# Patient Record
Sex: FEMALE | Race: WHITE | NOT HISPANIC OR LATINO | Employment: FULL TIME | URBAN - METROPOLITAN AREA
[De-identification: names, ages, dates, MRNs, and addresses within clinical notes are randomized per-mention and may not be internally consistent; named-entity substitution may affect disease eponyms.]

---

## 2017-01-31 ENCOUNTER — PRE VISIT (OUTPATIENT)
Dept: DERMATOLOGY | Facility: CLINIC | Age: 42
End: 2017-01-31

## 2017-01-31 NOTE — TELEPHONE ENCOUNTER
Left message for patient regarding upcoming appointment on 2/22/17 at 9:30am.  Informed patient to bring an updated list of allergies, medications, pharmacy details and insurance information. Asked patient to bring any dermatology records from outside [place] or the [facility] or have them faxed to [phone].    Patient Reminders Given:  --Plan on being in our facility for approximately one hour, this includes the registration process, office visit, education and check-out process.      --We are located on the second floor of the building, check in desk 4.   --If you need to cancel or reschedule, call [phone].  --We look forward to seeing you in Dermatology Clinic.     Tavia Ann LPN

## 2017-11-12 ENCOUNTER — HEALTH MAINTENANCE LETTER (OUTPATIENT)
Age: 42
End: 2017-11-12

## 2022-02-10 ENCOUNTER — TRANSCRIBE ORDERS (OUTPATIENT)
Dept: OTHER | Age: 47
End: 2022-02-10
Payer: COMMERCIAL

## 2022-02-10 DIAGNOSIS — Z17.0 ESTROGEN RECEPTOR POSITIVE STATUS (ER+): Primary | ICD-10-CM

## 2022-02-10 DIAGNOSIS — C50.412 MALIGNANT NEOPLASM OF UPPER-OUTER QUADRANT OF LEFT FEMALE BREAST (H): ICD-10-CM

## 2022-02-14 NOTE — PROGRESS NOTES
RECORDS STATUS - BREAST    RECORDS REQUESTED FROM: [facility] ([facility])    DATE REQUESTED: 2/17/2022   NOTES DETAILS STATUS   OFFICE NOTE from referring provider Complete Epic   Ref: Dr. Smith   OFFICE NOTE from medical oncologist Complete 2/7/2022 - OP Visit- Malignant neoplasm of upper-outer quadrant of left breast in female, estrogen receptor positive (HRC)     1/31/2022 -   Malignant neoplasm of upper-outer quadrant of left breast in female, estrogen receptor positive (HRC)      [facility] in [facility]   OFFICE NOTE from surgeon Complete See Breast biopsy from 10/5/2021 in [facility]   OFFICE NOTE from radiation oncologist     DISCHARGE REPORT from the ER     OPERATIVE REPORT Complete See Breast Biopsy Report in [facility] (PN)    MEDICATION LIST Complete [facility]   CLINICAL TRIAL TREATMENTS TO DATE     LABS     REQUEST BLOCKS FOR ALL BREAST CANCER PTS     PATHOLOGY REPORTS  (Tissue diagnosis, Stage, ER/ME percentage positive and intensity of staining, HER2 IHC, FISH, and all biopsies from breast and any distant metastasis)                 Complete 10/5/2021   [facility] Path Report in [facility]   A.  Breast, left, 1 o'clock  ultrasound, needle core biopsy:    Invasive lobular carcinoma, [place] grade 1      GENONOMIC TESTING     TYPE:   (Next Generation Sequencing, including Foundation One testing, and Oncotype score)     IMAGING (NEED IMAGES & REPORT)     CT SCANS     MRI Complete- [facility] MRI Breast 10/11/2021   MAMMO Complete- [facility] 12/20/2021, 10/5/2021   ULTRASOUND Complete- [facility] US Breast 12/20/2021   PET     BONE SCAN     BRAIN MRI       Action    Action Taken 2/14/2022 7:21AM [facility]     I faxed a STAT request for IMG to [facility]    10:11AM TULIO   I called pt Marylou - ori. I tried calling

## 2022-02-17 ENCOUNTER — OFFICE VISIT (OUTPATIENT)
Dept: RADIATION ONCOLOGY | Facility: CLINIC | Age: 47
End: 2022-02-17
Payer: COMMERCIAL

## 2022-02-17 ENCOUNTER — PRE VISIT (OUTPATIENT)
Dept: RADIATION ONCOLOGY | Facility: CLINIC | Age: 47
End: 2022-02-17

## 2022-02-17 VITALS
DIASTOLIC BLOOD PRESSURE: 82 MMHG | OXYGEN SATURATION: 99 % | RESPIRATION RATE: 18 BRPM | WEIGHT: 147.4 LBS | HEART RATE: 98 BPM | SYSTOLIC BLOOD PRESSURE: 112 MMHG | TEMPERATURE: 98.7 F

## 2022-02-17 DIAGNOSIS — Z17.0 ESTROGEN RECEPTOR POSITIVE STATUS (ER+): ICD-10-CM

## 2022-02-17 DIAGNOSIS — C50.412 MALIGNANT NEOPLASM OF UPPER-OUTER QUADRANT OF LEFT FEMALE BREAST (H): ICD-10-CM

## 2022-02-17 PROCEDURE — 99207 PR CDG-MDM COMPONENT: MEETS MODERATE - DOWN CODED: CPT | Performed by: SURGERY

## 2022-02-17 PROCEDURE — 99204 OFFICE O/P NEW MOD 45 MIN: CPT | Performed by: SURGERY

## 2022-02-17 RX ORDER — SPIRONOLACTONE 25 MG/1
25 TABLET ORAL
COMMUNITY
Start: 2021-05-28 | End: 2022-05-28

## 2022-02-17 RX ORDER — SERTRALINE HYDROCHLORIDE 100 MG/1
1 TABLET, FILM COATED ORAL DAILY
COMMUNITY
Start: 2021-05-28

## 2022-02-17 ASSESSMENT — PAIN SCALES - GENERAL: PAINLEVEL: NO PAIN (0)

## 2022-02-17 NOTE — LETTER
2022         RE: Marylou Vera  [address]  [place] [address]        Dear Colleague,    Thank you for referring your patient, Marylou Vera, to the [facility] [place]. Please see a copy of my visit note below.       Department of Radiation Oncology  [facility]  [facility]  [address] [address] [place] [id number]  [phone]       Consultation Note    Name: Marylou Vera MRN: [id number]   : 1975   Date of Service: 2022  Referring: Dr. Hadley     Reason for consultation: left breast cancer    History of Present Illness   Ms. Vera is a 46 year old female presenting with a LEFT ER+ (40%)/Per+ (1%)/Her2- invasive lobular carcinoma, multifocal cT3N0, grade 1 s/p NACT (completed AC x4, taxol with partial response. She is pending surgery with Dr. Smith in the future.    Briefly, her oncologic history is as follows:    Patient is a 46-year-old female who underwent a mammogram on 10/4/2021.  At that point mammogram demonstrated a large spiculated mass in the left breast at the 1 o'clock position approximately 5.5 cm in size, with the right breast being negative for any abnormality.    Ultrasound was performed on 10/4/2021 demonstrating a large spiculated mass in the left breast, without any evidence of lymphadenopathy.    She underwent biopsy on 10/5/2021 with pathology returning as an invasive lobular carcinoma, grade 1, ER positive (40%)/TX positive (1%)/HER-2 negative.    Breast MRI was obtained on 10/11/2021 demonstrating abnormal enhancement pattern measuring up to 8.2 cm in size extending from the 11 o'clock position to the 5 o'clock position with depth anterior to posterior, with evidence of a questionable satellite mass at the 12 o'clock position as well.  There is no evidence of any lymphadenopathy.  Based on this staging, she would be a clinically multifocal T3N0  left breast cancer (clinical prognostic Stage IIA).    She underwent genetic testing which was negative.    She was seen by Dr. Hadley (medical oncology) with plans for initiating neoadjuvant chemotherapy with AC x4 followed by Taxol x12.  After completion of AC she underwent a repeat imaging with ultrasound and mammogram.  Ultrasound on 2021 demonstrated scattered areas of minimal distortion but nothing measurable, while mammogram identified architectural distortion in the left upper outer quadrant thus slightly dense than prior mammogram with persistent abnormality.    She has remaining Taxol to be completed and we will tentatively complete neoadjuvant chemotherapy on 2022 she will also obtain a repeat breast MRI on 2022 and is going to meet with both the reconstructive surgeon as well as the resection surgeon (Dr. Smith) on 2022.    Today, patient denies any breast tenderness, pain, drainage, fevers, chills, extremity range of motion difficulties, chest pain, dyspnea, or weight loss.     Past Medical History:   Past Medical History:   Diagnosis Date     Malignant neoplasm of left breast in female, estrogen receptor positive (H) 10/05/2021     Moderate major depression (H)      Personal history of chemotherapy     Adriamycin/Cytoxan + Taxol (Dr. Hadely - [facility]) 2022 - 3/14/2022       Past Surgical History:   Past Surgical History:   Procedure Laterality Date     BREAST BIOPSY, CORE RT/LT Left 10/05/2021      SECTION        SECTION       EXTRACTION OF WISDOM TEETH         Chemotherapy History:  Per HPI    Radiation History:  No prior radiation    Pregnant: No (will need bHCG prior to radiation)  Implanted Cardiac Devices: No    Medications:  Current Outpatient Medications   Medication     sertraline (ZOLOFT) 100 MG tablet     spironolactone (ALDACTONE) 25 MG tablet     No current facility-administered medications for this visit.          Allergies:     Allergies   Allergen Reactions     Doxycycline Nausea and Other (See Comments)     joint pain     Propofol      Had flash pulmonary edema following D&C, consensus is this was cause.     Augmentin Nausea       Social History:  Tobacco: Non-smoker  Alcohol: Occasional  Employment:  at [employer]    Family History:  Family History   Problem Relation Age of Onset     Lipids Mother      Prostate Cancer Father      Lung Cancer Maternal Grandfather      Testicular cancer Paternal Grandfather        Review of Systems   A 10-point review of systems was performed. Pertinent findings are noted in the HPI.    Physical Exam   ECOG Status: 0    Vitals:  /82 (BP Location: Right arm, Patient Position: Chair, Cuff Size: Adult Regular)   Pulse 98   Temp 98.7  F (37.1  C) (Oral)   Resp 18   Wt 66.9 kg (147 lb 6.4 oz)   LMP 11/01/2021 (Approximate)   SpO2 99%     Gen: Alert, in NAD  Head: NC/AT  Eyes: PERRL, EOMI, sclera anicteric  Ears: No external auricular lesions  Nose/sinus: No rhinorrhea or epistaxis  Breast: breast incision healing well, incision CDI, no dehiscence, no upper extremity range of motion limitation     Oral cavity/oropharynx: MMM, no visible oral cavity lesions  Neck: Full ROM, supple, no palpable adenopathy  Pulm: No wheezing, stridor or respiratory distress  CV: Extremities are warm and well-perfused, no cyanosis, no pedal edema  Abdominal: Normal bowel sounds, soft, nontender, no masses  Musculoskeletal: Normal bulk and tone  Skin: Normal color and turgor  Neuro: A/Ox3, CN II-XII intact, normal gait    Imaging/Path/Labs   Imaging: per HPI, personally reviewed and in agreement     Path: per HPI, personally reviewed and in agreement     Labs: per HPI, personally reviewed and in agreement     Assessment    Ms. Vera is a 46 year old female presenting with a LEFT ER+ (40%)/Per+ (1%)/Her2- invasive lobular carcinoma, multifocal cT3N0, grade 1 (clinical prognostic Stage  IIA) s/p NACT (completed AC x4, taxol with partial response). She is pending surgery with Dr. Smith in the future.    In general, we discussed the role of adjuvant whole breast radiotherapy after breast conservation surgery per NCCN guidelines. In patients with negative axillary lymph nodes, WBRT +/- boost is standard of care.  In general, the benefits for whole breast radiation were reviewed and explained that adjuvant whole breast radiation following wide local excision decreases the risk of local recurrence by two-thirds to three-fourths and improves overall survival (Viveros et al., NSABP B-06, NEJ, 2002; EBCTCG Metanalysis, Lancet 2011).      In general, we reviewed the NCCN Guidelines for invasive breast cancer following total mastectomy with surgical axillary lymph node staging. If four or more axillary nodes are involved, radiation therapy to the chest wall plus infraclavicular region, supraclavicular area, internal mammary nodes, and any part of the axillary bed at risk is a category 1 recommendation (based on high-level evidence and uniform consensus of the NCCN). In the setting of one to three axillary nodes are involved, the recommendation is to strongly consider radiation to the chest wall plus the regional manju sites as above. In patients with negative axillary lymph nodes and tumor >5cm, the recommendation is to consider radiation to the chest wall and regional lymph nodes. These recommendations are supported by various literature (Rogelio et al, EBCTCG Minerva-analysis, Lancet 2005, update Lancet 2014; Navgaagilda et al., 82b and 82c Combined Analysis, Radiother Oncol 2007).    We also discussed the pros and cons of reconstruction from a oncologic outcome/surveillance and radiation perspective.     However we also discussed that at this point, we do not have a final pathology and that final treatment recommendation from a radiation perspective will be determined depending on final pathology.    Plan      1. Patient will go on to complete neoadjuvant chemotherapy in March 14, 2022.  She will be meeting with surgeon and plastic surgery to discuss resection and reconstruction options.    2.  We will have patient follow-up with radiation oncology approximately 3 weeks post surgery to review final pathology and adjuvant treatment recommendation. We will also plan for a CG prior to CT simulation.      All benefits and risks discussed, and patient is in agreement with the oncologic plan discussed above.     Thank you for allowing me to participate in your patient's care.  If you should require any additional information, please do not hesitate in contacting me.        Guillermo Ruiz MD  Department of Radiation Oncology  [facility]         Again, thank you for allowing me to participate in the care of your patient.        Sincerely,        Guillermo Ruiz MD

## 2022-02-17 NOTE — PATIENT INSTRUCTIONS
What to expect at your Simulation visit:    You will meet with a Radiation Therapist and other team members who will be doing a planning session called a  simulation  with you. This process will determine your daily treatment.    ~ You will lie on a flat table and have a treatment planning CT scan.  It is important during the scan to hold very still and breathe normally.    ~ Your therapist may construct a body mold to help you hold still for your treatments.    ~ If you are having treatment to the head or neck area you will be fitted with a plastic mesh mask that fits very snugly over your face and neck.     ~ Your therapist will be taking some digital photos that will go in your treatment chart.      ~Your therapist will make marks on your skin and take measurements. Your therapist may ask you about making small tattoos (a permanent small dot) over these marks.  These marks are used to position you daily for your radiation therapy treatments. Please do not wash off any marks until all of your radiation therapy treatments are complete unless you are instructed to do so by your therapist.    ~ Once the simulation is completed it can take from 3 to 10 business days before you start radiation therapy treatments.    ~ You may meet with a nurse who will go over management of treatment side effects and self care during your treatments. The nurse will help to plan care with other departments and physicians if needed.      Please contact [facility] RN with questions or concerns following today's appointment: [phone].    Thank you!

## 2022-02-17 NOTE — NURSING NOTE
INITIAL PATIENT ASSESSMENT      Diagnosis: breast cancer (left breast)    Prior radiation therapy: None    Prior chemotherapy:     Protocol: Adriamycin/Cytoxan  Facility: [facility] - Dr. Vijayakumar  Dates: 11/1/2021 - 12/13/2021      Protocol: Taxol  Facility: [facility] - Dr. Vijayakumar  Dates: 12/28/2021 - present, most recently received on 2/14/2022 and scheduled to complete Taxol on 3/14/2022    Patient reports she is scheduled to see plastic surgery on 2/22/2022 and follow-up with surgeon, Dr. Smith is scheduled 3/8/2022.      Prior hormonal therapy: patient reports history of oral birth control use for approximately 25 years, denies history of HRT use.    Pain Eval:  Denies    Psychosocial  Living arrangements: lives at home in [place] with  and two sons (ages 11 and 8), patient reports she is employed as a  with [employer].  Fall Risk: independent  [facility] Falls Risk Screening Completed: Yes Result: Negative   referral needs: Not needed    Advanced Directive: Yes - Location: patient has copy.  Implantable Cardiac Device: No  Authorization To Share Protected Health Information: YES - Date: 2/17/2022    Onset of menarche: age 12  LMP: approximately 11/1/2022, patient reports regular cycles prior to chemotherapy and menstrual cycle has stopped with chemotherapy.  Onset of menopause: NA  Abnormal vaginal bleeding/discharge: No  Are you pregnant? No  Reproductive note: patient reports history of four pregnancies with two live births, first at age 35, reports history of breastfeeding for approximately 12 months in total.  Urine Pregnancy Testing Needed: Yes, to be completed at time of CT Simulation.    Review of Systems     Constitutional: Positive for malaise/fatigue. Negative for chills, diaphoresis, fever and weight loss.        Patient reports generalized fatigue and weakness with chemotherapy.   HENT: Negative.    Eyes: Negative.    Respiratory: Negative.   "  Cardiovascular: Negative.    Gastrointestinal: Negative.    Genitourinary: Negative.    Musculoskeletal: Negative.    Skin: Negative.    Neurological: Positive for tingling. Negative for dizziness, tremors, sensory change, speech change, focal weakness, seizures, loss of consciousness, weakness and headaches.        Patient reports faint neuropathy of fingers and toes related to Taxol.   Endo/Heme/Allergies: Negative.    Psychiatric/Behavioral: Positive for depression. Negative for hallucinations, memory loss, substance abuse and suicidal ideas. The patient is nervous/anxious. The patient does not have insomnia.         Patient reports intermittent depression and anxiety with diagnosis and treatment, \"it's just a really long process\".  Patient reports strong support system through family and friends along with her employer, patient reports she is connected through [employer] and is seeking therapy through a professional counselor.  Active listening and support provided.       Nurse face-to-face time: Level 5:  over 15 min face to face time.    Shanna Wiley RN BSN OCN CBCN      "

## 2022-02-18 NOTE — PROGRESS NOTES
Department of Radiation Oncology  [facility]: Cancer Center  [facility]  [address] [place] [id number]  [phone]       Consultation Note    Name: Marylou Vera MRN: [id number]   : 1975   Date of Service: 2022  Referring: Dr. Hadley     Reason for consultation: left breast cancer    History of Present Illness   Ms. Vera is a 46 year old female presenting with a LEFT ER+ (40%)/Per+ (1%)/Her2- invasive lobular carcinoma, multifocal cT3N0, grade 1 s/p NACT (completed AC x4, taxol with partial response. She is pending surgery with Dr. Smith in the future.    Briefly, her oncologic history is as follows:    Patient is a 46-year-old female who underwent a mammogram on 10/4/2021.  At that point mammogram demonstrated a large spiculated mass in the left breast at the 1 o'clock position approximately 5.5 cm in size, with the right breast being negative for any abnormality.    Ultrasound was performed on 10/4/2021 demonstrating a large spiculated mass in the left breast, without any evidence of lymphadenopathy.    She underwent biopsy on 10/5/2021 with pathology returning as an invasive lobular carcinoma, grade 1, ER positive (40%)/MT positive (1%)/HER-2 negative.    Breast MRI was obtained on 10/11/2021 demonstrating abnormal enhancement pattern measuring up to 8.2 cm in size extending from the 11 o'clock position to the 5 o'clock position with depth anterior to posterior, with evidence of a questionable satellite mass at the 12 o'clock position as well.  There is no evidence of any lymphadenopathy.  Based on this staging, she would be a clinically multifocal T3N0 left breast cancer (clinical prognostic Stage IIA).    She underwent genetic testing which was negative.    She was seen by Dr. Hadley (medical oncology) with plans for initiating neoadjuvant chemotherapy with AC x4 followed by Taxol x12.  After completion of AC  she underwent a repeat imaging with ultrasound and mammogram.  Ultrasound on 2021 demonstrated scattered areas of minimal distortion but nothing measurable, while mammogram identified architectural distortion in the left upper outer quadrant thus slightly dense than prior mammogram with persistent abnormality.    She has remaining Taxol to be completed and we will tentatively complete neoadjuvant chemotherapy on 2022 she will also obtain a repeat breast MRI on 2022 and is going to meet with both the reconstructive surgeon as well as the resection surgeon (Dr. Smith) on 2022.    Today, patient denies any breast tenderness, pain, drainage, fevers, chills, extremity range of motion difficulties, chest pain, dyspnea, or weight loss.     Past Medical History:   Past Medical History:   Diagnosis Date     Malignant neoplasm of left breast in female, estrogen receptor positive (H) 10/05/2021     Moderate major depression (H)      Personal history of chemotherapy     Adriamycin/Cytoxan + Taxol (Dr. Vijayakumar - [facility]) 2022 - 3/14/2022       Past Surgical History:   Past Surgical History:   Procedure Laterality Date     BREAST BIOPSY, CORE RT/LT Left 10/05/2021      SECTION        SECTION       EXTRACTION OF WISDOM TEETH         Chemotherapy History:  Per HPI    Radiation History:  No prior radiation    Pregnant: No (will need bHCG prior to radiation)  Implanted Cardiac Devices: No    Medications:  Current Outpatient Medications   Medication     sertraline (ZOLOFT) 100 MG tablet     spironolactone (ALDACTONE) 25 MG tablet     No current facility-administered medications for this visit.       Allergies:     Allergies   Allergen Reactions     Doxycycline Nausea and Other (See Comments)     joint pain     Propofol      Had flash pulmonary edema following D&C, consensus is this was cause.     Augmentin Nausea       Social History:  Tobacco: Non-smoker  Alcohol:  Occasional  Employment:  at [employer]    Family History:  Family History   Problem Relation Age of Onset     Lipids Mother      Prostate Cancer Father      Lung Cancer Maternal Grandfather      Testicular cancer Paternal Grandfather        Review of Systems   A 10-point review of systems was performed. Pertinent findings are noted in the HPI.    Physical Exam   ECOG Status: 0    Vitals:  /82 (BP Location: Right arm, Patient Position: Chair, Cuff Size: Adult Regular)   Pulse 98   Temp 98.7  F (37.1  C) (Oral)   Resp 18   Wt 66.9 kg (147 lb 6.4 oz)   LMP 11/01/2021 (Approximate)   SpO2 99%     Gen: Alert, in NAD  Head: NC/AT  Eyes: PERRL, EOMI, sclera anicteric  Ears: No external auricular lesions  Nose/sinus: No rhinorrhea or epistaxis  Breast: no upper extremity range of motion limitation     Oral cavity/oropharynx: MMM, no visible oral cavity lesions  Neck: Full ROM, supple, no palpable adenopathy  Pulm: No wheezing, stridor or respiratory distress  CV: Extremities are warm and well-perfused, no cyanosis, no pedal edema  Abdominal: Normal bowel sounds, soft, nontender, no masses  Musculoskeletal: Normal bulk and tone  Skin: Normal color and turgor  Neuro: A/Ox3, CN II-XII intact, normal gait    Imaging/Path/Labs   Imaging: per HPI, personally reviewed and in agreement     Path: per HPI, personally reviewed and in agreement     Labs: per HPI, personally reviewed and in agreement     Assessment    Ms. Vera is a 46 year old female presenting with a LEFT ER+ (40%)/Per+ (1%)/Her2- invasive lobular carcinoma, multifocal cT3N0, grade 1 (clinical prognostic Stage IIA) s/p NACT (completed AC x4, taxol with partial response). She is pending surgery with Dr. Smith in the future.    In general, we discussed the role of adjuvant whole breast radiotherapy after breast conservation surgery per NCCN guidelines. In patients with negative axillary lymph nodes, WBRT +/- boost is standard of care.  In  general, the benefits for whole breast radiation were reviewed and explained that adjuvant whole breast radiation following wide local excision decreases the risk of local recurrence by two-thirds to three-fourths and improves overall survival (Viveros et al., NSABP B-06, NEJ, 2002; EBCTCG Metanalysis, Lancet 2011).      In general, we reviewed the NCCN Guidelines for invasive breast cancer following total mastectomy with surgical axillary lymph node staging. If four or more axillary nodes are involved, radiation therapy to the chest wall plus infraclavicular region, supraclavicular area, internal mammary nodes, and any part of the axillary bed at risk is a category 1 recommendation (based on high-level evidence and uniform consensus of the NCCN). In the setting of one to three axillary nodes are involved, the recommendation is to strongly consider radiation to the chest wall plus the regional manju sites as above. In patients with negative axillary lymph nodes and tumor >5cm, the recommendation is to consider radiation to the chest wall and regional lymph nodes. These recommendations are supported by various literature (Rogelio et al, EBCTCG [place]-analysis, Lancet 2005, update Lancet 2014; Navgaagilda et al., 82b and 82c Combined Analysis, Radiother Oncol 2007).    We also discussed the pros and cons of reconstruction from a oncologic outcome/surveillance and radiation perspective.     However we also discussed that at this point, we do not have a final pathology and that final treatment recommendation from a radiation perspective will be determined depending on final pathology.    Plan     1. Patient will go on to complete neoadjuvant chemotherapy in March 14, 2022.  She will be meeting with surgeon and plastic surgery to discuss resection and reconstruction options.    2.  We will have patient follow-up with radiation oncology approximately 3 weeks post surgery to review final pathology and adjuvant treatment  recommendation. We will also plan for a bHCG prior to CT simulation.      All benefits and risks discussed, and patient is in agreement with the oncologic plan discussed above.     Thank you for allowing me to participate in your patient's care.  If you should require any additional information, please do not hesitate in contacting me.        Guillermo Ruiz MD  Department of Radiation Oncology  [facility]

## 2022-03-19 ENCOUNTER — HEALTH MAINTENANCE LETTER (OUTPATIENT)
Age: 47
End: 2022-03-19

## 2022-04-01 ENCOUNTER — PATIENT OUTREACH (OUTPATIENT)
Dept: RADIATION ONCOLOGY | Facility: CLINIC | Age: 47
End: 2022-04-01
Payer: COMMERCIAL

## 2022-04-01 NOTE — TELEPHONE ENCOUNTER
Patient originally consulted with Dr. Guillermo Ruiz for radiation oncology on 2/17/2022, pre-surgical consultation.  Patient completed chemotherapy on 3/14/2022 and plans for bilateral mastectomies without immediate reconstruction on 4/13/2022.  This RN contacted patient, confirmed above information and surgical date scheduled.  Reviewed plan to bring patient back into clinic two weeks following surgical date for return consultation with Dr. Ruiz and review recommendations for radiation therapy following completion of pathology.  Patient verbalized understanding and return consultation appointment scheduled for Thursday, 4/28/2022 at 0945.   CT Simulation with urine HCG to be scheduled at a later date pending official recommendations for radiation therapy and patient verbalized understanding.  Patient confirmed appointment details and had no questions at this time.    Patient reports post-surgical follow-up scheduled 4/26/2022 with Dr. Smith.    Dr. Ruiz updated via Epic in-basket.    Shanna Wiley RN BSN OCN CBCN

## 2022-04-28 ENCOUNTER — OFFICE VISIT (OUTPATIENT)
Dept: RADIATION ONCOLOGY | Facility: CLINIC | Age: 47
End: 2022-04-28
Payer: COMMERCIAL

## 2022-04-28 VITALS
WEIGHT: 147 LBS | TEMPERATURE: 97.9 F | DIASTOLIC BLOOD PRESSURE: 81 MMHG | HEART RATE: 79 BPM | SYSTOLIC BLOOD PRESSURE: 117 MMHG | OXYGEN SATURATION: 96 % | RESPIRATION RATE: 18 BRPM

## 2022-04-28 DIAGNOSIS — Z17.0 MALIGNANT NEOPLASM OF UPPER-OUTER QUADRANT OF LEFT BREAST IN FEMALE, ESTROGEN RECEPTOR POSITIVE (H): Primary | ICD-10-CM

## 2022-04-28 DIAGNOSIS — C50.412 MALIGNANT NEOPLASM OF UPPER-OUTER QUADRANT OF LEFT BREAST IN FEMALE, ESTROGEN RECEPTOR POSITIVE (H): Primary | ICD-10-CM

## 2022-04-28 PROCEDURE — 99215 OFFICE O/P EST HI 40 MIN: CPT | Performed by: SURGERY

## 2022-04-28 RX ORDER — ACETAMINOPHEN 500 MG
500-1000 TABLET ORAL EVERY 6 HOURS PRN
COMMUNITY

## 2022-04-28 ASSESSMENT — PAIN SCALES - GENERAL: PAINLEVEL: MODERATE PAIN (4)

## 2022-04-28 NOTE — NURSING NOTE
"Return Consultation Visit        Diagnosis: breast cancer (left breast)     Prior Radiation Therapy: None     Prior Chemotherapy:      Protocol: Adriamycin/Cytoxan  Facility: [facility] - Dr. Vijayakumar  Dates: 11/1/2021 - 12/13/2021     Protocol: Taxol  Facility: [facility] - Dr. Vijayakumar  Dates: 12/28/2021 - present, most recently received on 2/14/2022 and scheduled to complete Taxol on 3/14/2022     Surgical History:    4/13/2022: Bilateral mastectomy with left axillary sentinel lymph node biopsy and port removal  Surgeon: Dr. Smith     Prior Hormonal Therapy: patient reports history of oral birth control use for approximately 25 years, denies history of HRT use.     Pain Eval:  Patient reports pain at current visit \"4/10\" and reports discomfort and sore across bilateral chest and bilateral axilla.  Patient reports taking Tylenol 1,000 mg po as needed and Ibuprofen 600 mg po as needed.   Patient denies concerns with range of motion of bilateral upper extremities, patient reports she has been active since removal of VARSHA drains.  Reviewed daily range of motion stretching exercises with patient and also reviewed lymphedema therapy prevention with patient.     Psychosocial  Living arrangements: lives at home in [place] with  and two sons (ages 11 and 8), patient reports she is employed as a  with [employer].  Fall Risk: independent  [facility] Falls Risk Screening Completed: Yes Result: Negative   referral needs: Not needed     Advanced Directive: Yes - Location: patient has copy.  Implantable Cardiac Device: No  Authorization To Share Protected Health Information: YES - Date: 2/17/2022     Onset of menarche: age 12  LMP: approximately 11/1/2022, patient reports regular cycles prior to chemotherapy and menstrual cycle has stopped with chemotherapy.  Onset of menopause: NA  Abnormal vaginal bleeding/discharge: No  Are you pregnant? No  Reproductive note: patient reports history of " four pregnancies with two live births, first at age 35, reports history of breastfeeding for approximately 12 months in total.  Urine Pregnancy Testing Needed: Yes, to be completed at time of CT Simulation.        Nurse face-to-face time: Level 5:  over 15 min face to face time.     Shanna Wiley RN BSN OCN CBCN

## 2022-04-28 NOTE — PATIENT INSTRUCTIONS
Please contact [facility] RN with questions or concerns following today's appointment: [phone].    Thank you!

## 2022-04-28 NOTE — LETTER
2022         RE: Marylou Vera  [address]  [place] [address]        Dear Colleague,    Thank you for referring your patient, Marylou Vera, to the [facility] RADIATION ONCOLOG[facility] [place]. Please see a copy of my visit note below.       Department of Radiation Oncology  [facility]  [facility]  [address] [address] [place] [id number]  [phone]       Follow up Note    Name: Marylou Vera MRN: [id number]   : 1975   Date of Service: 22 Referring: Dr. Hadley     Reason for consultation: left breast cancer    History of Present Illness     Ms. Vera is a 46 year old female presenting with a LEFT ER+ (40%)/UT+ (1%)/Her2- invasive lobular carcinoma, multifocal cT3N0, grade 1 s/p NACT (completed AC x4, taxol with partial response). She underwent bilateral mastectomy and left sentinel lymph node biopsy (Dr. Smith, 22) with pathology revealing residual 7.0 cm of invasive lobular carcinoma, grade 2, negative margins, no LVSI, 0/1 LN, ypT3N0. Right sided mastectomy pathology was negative for malignancy.    Briefly, her oncologic history is as follows:    Patient is a 46-year-old female who underwent a mammogram on 10/4/2021.  At that point mammogram demonstrated a large spiculated mass in the left breast at the 1 o'clock position approximately 5.5 cm in size, with the right breast being negative for any abnormality.    Ultrasound was performed on 10/4/2021 demonstrating a large spiculated mass in the left breast, without any evidence of lymphadenopathy.    She underwent biopsy on 10/5/2021 with pathology returning as an invasive lobular carcinoma, grade 1, ER positive (40%)/UT positive (1%)/HER-2 negative.    Breast MRI was obtained on 10/11/2021 demonstrating abnormal enhancement pattern measuring up to 8.2 cm in size extending from the 11 o'clock position to the 5 o'clock position with  depth anterior to posterior, with evidence of a questionable satellite mass at the 12 o'clock position as well.  There is no evidence of any lymphadenopathy.  Based on this staging, she would be a clinically multifocal T3N0 left breast cancer (clinical prognostic Stage IIA).    She underwent genetic testing which was negative.    She was seen by Dr. Hadley (medical oncology) with plans for initiating neoadjuvant chemotherapy with AC x4 followed by Taxol x12.  After completion of AC she underwent a repeat imaging with ultrasound and mammogram.  Ultrasound on 12/20/2021 demonstrated scattered areas of minimal distortion but nothing measurable, while mammogram identified architectural distortion in the left upper outer quadrant thus slightly dense than prior mammogram with persistent abnormality.    Interval history:    She completed NACT on March 14, 2022. MRI 3/7/22 demonstrated no residual suspicious enhancement in the left breast, most consistent with radiologic response to therapy. There was no lymphadenopathy.    She met with plastic surgery (Dr. Burroughs) with plans for delayed reconstruction.    She subsequently underwent bilateral mastectomy with left sentinel lymph node biopsy on 4/13/2022.  Pathology returned  residual 7.0 cm of invasive lobular carcinoma, grade 2, negative margins, no LVSI, 0/1 LN, ypT3N0. Right sided mastectomy pathology was negative for malignancy.    Today, patient with bilateral breast tenderness, up to a 4/10 and mild edema bilaterally with small seroma formation. However, she denies drainage, fevers, chills, extremity range of motion difficulties, chest pain, dyspnea, or weight loss.     Past Medical History:   Past Medical History:   Diagnosis Date     Malignant neoplasm of left breast in female, estrogen receptor positive (H) 10/05/2021     Moderate major depression (H)      Personal history of chemotherapy     Adriamycin/Cytoxan + Taxol (Dr. Hadley - [facility])  2022 - 3/14/2022       Past Surgical History:   Past Surgical History:   Procedure Laterality Date     BREAST BIOPSY, CORE RT/LT Left 10/05/2021      SECTION        SECTION       EXTRACTION OF WISDOM TEETH         Chemotherapy History:  Per HPI    Radiation History:  No prior radiation    Pregnant: No (will need bHCG prior to radiation)  Implanted Cardiac Devices: No    Medications:  Current Outpatient Medications   Medication     acetaminophen (TYLENOL) 500 MG tablet     Fexofenadine HCl (ALLEGRA PO)     Fluticasone Propionate (FLONASE NA)     IBUPROFEN PO     sertraline (ZOLOFT) 100 MG tablet     spironolactone (ALDACTONE) 25 MG tablet     No current facility-administered medications for this visit.       Allergies:     Allergies   Allergen Reactions     Doxycycline Nausea and Other (See Comments)     joint pain     Propofol      Had flash pulmonary edema following D&C, consensus is this was cause.     Augmentin Nausea       Social History:  Tobacco: Non-smoker  Alcohol: Occasional  Employment:  at [employer]    Family History:  Family History   Problem Relation Age of Onset     Lipids Mother      Prostate Cancer Father      Lung Cancer Maternal Grandfather      Testicular cancer Paternal Grandfather        Review of Systems   A 10-point review of systems was performed. Pertinent findings are noted in the HPI.    Physical Exam   ECOG Status: 0    Vitals:  /81 (BP Location: Right arm, Patient Position: Chair, Cuff Size: Adult Regular)   Pulse 79   Temp 97.9  F (36.6  C) (Oral)   Resp 18   Wt 66.7 kg (147 lb)   SpO2 96%     Gen: Alert, in NAD  Head: NC/AT  Eyes: PERRL, EOMI, sclera anicteric  Ears: No external auricular lesions  Nose/sinus: No rhinorrhea or epistaxis  Breast: b/l chest wall healing well, incisions CDI, no drainage, mild edema/seroma formation, no upper extremity range of motion limitation     Oral cavity/oropharynx: MMM, no visible oral cavity lesions  Neck:  Full ROM, supple, no palpable adenopathy  Pulm: No wheezing, stridor or respiratory distress  CV: Extremities are warm and well-perfused, no cyanosis, no pedal edema  Abdominal: Normal bowel sounds, soft, nontender, no masses  Musculoskeletal: Normal bulk and tone  Skin: Normal color and turgor  Neuro: A/Ox3, CN II-XII intact, normal gait    Imaging/Path/Labs   Imaging: per HPI, personally reviewed and in agreement     Path: per HPI, personally reviewed and in agreement     Labs: per HPI, personally reviewed and in agreement     Assessment      Ms. Vera is a 46 year old female presenting with a LEFT ER+ (40%)/MS+ (1%)/Her2- invasive lobular carcinoma, multifocal cT3N0, grade 1 s/p NACT (completed AC x4, taxol with partial response). She underwent bilateral mastectomy and left sentinel lymph node biopsy (Dr. Smith, 4/13/22) with pathology revealing residual 7.0 cm of invasive lobular carcinoma, grade 2, negative margins, no LVSI, 0/1 LN, ypT3N0. Right sided mastectomy pathology was negative for malignancy.    In general, we discussed the role of adjuvant whole breast radiotherapy after breast conservation surgery per NCCN guidelines. In patients with negative axillary lymph nodes, WBRT +/- boost is standard of care.  In general, the benefits for whole breast radiation were reviewed and explained that adjuvant whole breast radiation following wide local excision decreases the risk of local recurrence by two-thirds to three-fourths and improves overall survival (Viveros et al., NSABP B-06, NEJ, 2002; EBCTCG Metanalysis, Lancet 2011).      In general, we reviewed the NCCN Guidelines for invasive breast cancer following total mastectomy with surgical axillary lymph node staging. If four or more axillary nodes are involved, radiation therapy to the chest wall plus infraclavicular region, supraclavicular area, internal mammary nodes, and any part of the axillary bed at risk is a category 1 recommendation (based on  high-level evidence and uniform consensus of the NCCN). In the setting of one to three axillary nodes are involved, the recommendation is to strongly consider radiation to the chest wall plus the regional manju sites as above. In patients with negative axillary lymph nodes and tumor >5cm, the recommendation is to consider radiation to the chest wall and regional lymph nodes. These recommendations are supported by various literature (Rogelio et al, EBCTCG [place]-analysis, Lancet 2005, update Lancet 2014; Overgaard et al., 82b and 82c Combined Analysis, Radiother Oncol 2007).    In general, we discussed that despite histology, whether IDC or ILC, indications for PMRT are the same.  However, in general ILC generally have a poorer response to NACT. Thus, we discussed data/literature in the setting of NACT as well as T3N0 literature when NACT was not utilized.  In the setting of NACT, based upon analysis of patients enrolled in NSABP B-18 and B-27 (Janet et al, JCO, 2012)., those without complete pathologic response with tumors greater than 5 cm had approximately a 12-15% locoregional recurrence risk without radiotherapy at 10 years, with a majority of that risk at the chest wall. Further, we discussed historical T3N0 literature, we discussed there exists no randomized data for the role of adjuvant radiotherapy in cT3N0 patients that had mastectomy. In general, the role of adjuvant radiotherapy in these patients in relatively controversial, given low likelihood of recurrence in that setting after mastectomy alone. Retrospective studies that looked specifically at T3N0 tumors after upfront surgery showed low rates of recurrence without radiation, approximately 7% in all patients, and even lower for patients that received systemic therapy and without lymphovascular space invasion (Margie turner al, NASBP Pooled Analysis, JCO, 2006; Darrick et al., Multi-Institutional Analysis, IJROBP, 2006).        Further, in this particular  "case, in addition to outcomes discussed above, we have to also factor patient's young age, desire for delayed reconstruction, as well as toxicity to normal organs at risk including heart and lung.  We discussed options of observation with endocrine therapy versus CW/\"high tangents\" to cover part of level I/II to be followed by endocrine thearpy, or comprehensive manju irradiation to be followed by endocrine therapy.        Additional time was spent specifically discussing the additional risk of cardiovascular disease due to radiation therapy. We reviewed retrospective data attributing a relative risk increase of 7.4% per Gy of mean heart dose (Meeta et al., NEJ, 2013). We discussed the meaning of relative risk in the setting of any other risk factors for cardiovascular disease, irrespective of the cancer diagnosis and/or treatment. Finally, we reviewed treatment techniques utilized in modern radiation therapy to minimize radiation dose to the heart.  Further, we discussed the logistics of treatment planning and delivery in detail with the patient. We also discussed side effects, including short term risks of fatigue and skin reaction, and long term risks of pneumonitis, lung fibrosis, soft tissue fibrosis, skin changes, breast contour changes, rib fractures, cardiac damage, secondary cancers, lymphedema, implant/reconstruction failure, and thyroid dysfunction. We described the use of techniques to minimize dose to the normal tissues, while adequately covering the target tissues, by using deep inspiratory breathhold (DIBH) and the ability of this technique to decrease potential for toxicity.  Plan     1. After extensive discussion, patient is leaning towards pursuing radiation in the adjuvant setting, with preference to CW/high tangents approach to add coverage to axillary levels I/II.   Given patient's wish for delayed reconstruction, plan would be for 50Gy/25fx with deep inspiratory breath hold.    2.  We will " schedule patient later in 5/24/22 for a follow up and CT simulation. This will allow patient to heal and finalize her treatment decision. We will also plan for a CG prior to CT simulation.     3. She will be seeing Dr. Hadley next week for endocrine therapy discussion.      All benefits and risks discussed, and patient is in agreement with the oncologic plan discussed above.     Thank you for allowing me to participate in your patient's care.  If you should require any additional information, please do not hesitate in contacting me.        Guillermo Ruiz MD  Department of Radiation Oncology  [facility]       Again, thank you for allowing me to participate in the care of your patient.        Sincerely,        Guillermo Ruiz MD

## 2022-05-12 DIAGNOSIS — C50.412 MALIGNANT NEOPLASM OF UPPER-OUTER QUADRANT OF LEFT BREAST IN FEMALE, ESTROGEN RECEPTOR POSITIVE (H): Primary | ICD-10-CM

## 2022-05-12 DIAGNOSIS — Z17.0 MALIGNANT NEOPLASM OF UPPER-OUTER QUADRANT OF LEFT BREAST IN FEMALE, ESTROGEN RECEPTOR POSITIVE (H): Primary | ICD-10-CM

## 2022-05-12 PROCEDURE — 99207 PR NO BILLABLE SERVICE THIS VISIT: CPT

## 2022-05-24 ENCOUNTER — LAB (OUTPATIENT)
Dept: LAB | Facility: CLINIC | Age: 47
End: 2022-05-24
Payer: COMMERCIAL

## 2022-05-24 ENCOUNTER — OFFICE VISIT (OUTPATIENT)
Dept: RADIATION ONCOLOGY | Facility: CLINIC | Age: 47
End: 2022-05-24

## 2022-05-24 VITALS
DIASTOLIC BLOOD PRESSURE: 81 MMHG | SYSTOLIC BLOOD PRESSURE: 117 MMHG | HEART RATE: 76 BPM | TEMPERATURE: 98 F | RESPIRATION RATE: 18 BRPM | WEIGHT: 147 LBS | OXYGEN SATURATION: 99 %

## 2022-05-24 DIAGNOSIS — C50.412 MALIGNANT NEOPLASM OF UPPER-OUTER QUADRANT OF LEFT BREAST IN FEMALE, ESTROGEN RECEPTOR POSITIVE (H): Primary | ICD-10-CM

## 2022-05-24 DIAGNOSIS — Z17.0 MALIGNANT NEOPLASM OF UPPER-OUTER QUADRANT OF LEFT BREAST IN FEMALE, ESTROGEN RECEPTOR POSITIVE (H): Primary | ICD-10-CM

## 2022-05-24 DIAGNOSIS — C50.412 MALIGNANT NEOPLASM OF UPPER-OUTER QUADRANT OF LEFT BREAST IN FEMALE, ESTROGEN RECEPTOR POSITIVE (H): ICD-10-CM

## 2022-05-24 DIAGNOSIS — Z17.0 MALIGNANT NEOPLASM OF UPPER-OUTER QUADRANT OF LEFT BREAST IN FEMALE, ESTROGEN RECEPTOR POSITIVE (H): ICD-10-CM

## 2022-05-24 LAB — HCG UR QL: NEGATIVE

## 2022-05-24 PROCEDURE — 81025 URINE PREGNANCY TEST: CPT

## 2022-05-24 PROCEDURE — 99214 OFFICE O/P EST MOD 30 MIN: CPT | Performed by: SURGERY

## 2022-05-24 ASSESSMENT — PAIN SCALES - GENERAL: PAINLEVEL: NO PAIN (0)

## 2022-05-24 NOTE — LETTER
2022         RE: Marylou Vera  [address]  [place] [address]        Dear Colleague,    Thank you for referring your patient, Marylou Vera, to the [facility]COL[facility] [place]. Please see a copy of my visit note below.       Department of Radiation Oncology  [facility]  [facility]  [address] [address] [place] [id number]  [phone]       Follow up Note    Name: Marylou Vera MRN: [id number]   : 1975   Date of Service: 2022 Referring: Dr. Hadley     Reason for consultation: left breast cancer    History of Present Illness     Ms. Vera is a 46 year old female presenting with a LEFT ER+ (40%)/CA+ (1%)/Her2- invasive lobular carcinoma, multifocal cT3N0, grade 1 s/p NACT (completed AC x4, taxol with partial response). She underwent bilateral mastectomy and left sentinel lymph node biopsy (Dr. Smith, 22) with pathology revealing residual 7.0 cm of invasive lobular carcinoma, grade 2, negative margins, no LVSI, 0/1 LN, ypT3N0. Right sided mastectomy pathology was negative for malignancy.    Briefly, her oncologic history is as follows:    Patient is a 46-year-old female who underwent a mammogram on 10/4/2021.  At that point mammogram demonstrated a large spiculated mass in the left breast at the 1 o'clock position approximately 5.5 cm in size, with the right breast being negative for any abnormality.    Ultrasound was performed on 10/4/2021 demonstrating a large spiculated mass in the left breast, without any evidence of lymphadenopathy.    She underwent biopsy on 10/5/2021 with pathology returning as an invasive lobular carcinoma, grade 1, ER positive (40%)/CA positive (1%)/HER-2 negative.    Breast MRI was obtained on 10/11/2021 demonstrating abnormal enhancement pattern measuring up to 8.2 cm in size extending from the 11 o'clock position to the 5 o'clock position with  depth anterior to posterior, with evidence of a questionable satellite mass at the 12 o'clock position as well.  There is no evidence of any lymphadenopathy.  Based on this staging, she would be a clinically multifocal T3N0 left breast cancer (clinical prognostic Stage IIA).    She underwent genetic testing which was negative.    She was seen by Dr. Hadley (medical oncology) with plans for initiating neoadjuvant chemotherapy with AC x4 followed by Taxol x12.  After completion of AC she underwent a repeat imaging with ultrasound and mammogram.  Ultrasound on 12/20/2021 demonstrated scattered areas of minimal distortion but nothing measurable, while mammogram identified architectural distortion in the left upper outer quadrant thus slightly dense than prior mammogram with persistent abnormality.      She completed NACT on March 14, 2022. MRI 3/7/22 demonstrated no residual suspicious enhancement in the left breast, most consistent with radiologic response to therapy. There was no lymphadenopathy.    She met with plastic surgery (Dr. Burroughs) with plans for delayed reconstruction.    She subsequently underwent bilateral mastectomy with left sentinel lymph node biopsy on 4/13/2022.  Pathology returned  residual 7.0 cm of invasive lobular carcinoma, grade 2, negative margins, no LVSI, 0/1 LN, ypT3N0. Right sided mastectomy pathology was negative for malignancy.    Interval history:    She was last seen in April 28, 2022 with plan for CT simulation and radiation planning to begin.  In the interval she has obtained a second opinion at [facility], who was recommended chest wall radiation as well as regional nodes with possible use of protons, which she is interested in. Otherwise, patient is doing well after surgery and denies any chest pain, tenderness, range of motion difficulties.     Past Medical History:   Past Medical History:   Diagnosis Date     Malignant neoplasm of left breast in female, estrogen receptor  positive (H) 10/05/2021     Moderate major depression (H)      Personal history of chemotherapy     Adriamycin/Cytoxan + Taxol (Dr. Vijayakumar - [facility]) 2022 - 3/14/2022       Past Surgical History:   Past Surgical History:   Procedure Laterality Date     BILATERAL MASTECTOMY WITH LEFT AXILLARY SENTINEL LYMPH NODE BIOPSY AND PORT REMOVAL          2022    Dr. Ritter - [facility]     BREAST BIOPSY, CORE RT/LT Left 10/05/2021      SECTION        SECTION       EXTRACTION OF WISDOM TEETH         Chemotherapy History:  Per HPI    Radiation History:  No prior radiation    Pregnant: No (will need bHCG prior to radiation)  Implanted Cardiac Devices: No    Medications:  Current Outpatient Medications   Medication     acetaminophen (TYLENOL) 500 MG tablet     Fexofenadine HCl (ALLEGRA PO)     Fluticasone Propionate (FLONASE NA)     IBUPROFEN PO     sertraline (ZOLOFT) 100 MG tablet     spironolactone (ALDACTONE) 25 MG tablet     No current facility-administered medications for this visit.       Allergies:     Allergies   Allergen Reactions     Doxycycline Nausea and Other (See Comments)     joint pain     Propofol      Had flash pulmonary edema following D&C, consensus is this was cause.     Augmentin Nausea       Social History:  Tobacco: Non-smoker  Alcohol: Occasional  Employment:  at [employer]    Family History:  Family History   Problem Relation Age of Onset     Lipids Mother      Prostate Cancer Father      Lung Cancer Maternal Grandfather      Testicular cancer Paternal Grandfather        Review of Systems   A 10-point review of systems was performed. Pertinent findings are noted in the HPI.    Physical Exam   ECOG Status: 0    Vitals:  /81 (BP Location: Right arm, Patient Position: Chair, Cuff Size: Adult Regular)   Pulse 76   Temp 98  F (36.7  C) (Oral)   Resp 18   Wt 66.7 kg (147 lb)   SpO2 99%     Gen: Alert, in NAD  Head: NC/AT  Eyes: PERRL, EOMI, sclera  anicteric  Ears: No external auricular lesions  Nose/sinus: No rhinorrhea or epistaxis  Breast: b/l chest wall healing well, incisions CDI, no drainage, mild edema/seroma formation, no upper extremity range of motion limitation     Oral cavity/oropharynx: MMM, no visible oral cavity lesions  Neck: Full ROM, supple, no palpable adenopathy  Pulm: No wheezing, stridor or respiratory distress  CV: Extremities are warm and well-perfused, no cyanosis, no pedal edema  Abdominal: Normal bowel sounds, soft, nontender, no masses  Musculoskeletal: Normal bulk and tone  Skin: Normal color and turgor  Neuro: A/Ox3, CN II-XII intact, normal gait    Imaging/Path/Labs   Imaging: per HPI, personally reviewed and in agreement     Path: per HPI, personally reviewed and in agreement     Labs: per HPI, personally reviewed and in agreement     Assessment      Ms. Vera is a 46 year old female presenting with a LEFT ER+ (40%)/AR+ (1%)/Her2- invasive lobular carcinoma, multifocal cT3N0, grade 1 s/p NACT (completed AC x4, taxol with partial response). She underwent bilateral mastectomy and left sentinel lymph node biopsy (Dr. Smith, 4/13/22) with pathology revealing residual 7.0 cm of invasive lobular carcinoma, grade 2, negative margins, no LVSI, 0/1 LN, ypT3N0. Right sided mastectomy pathology was negative for malignancy.    In general, we discussed the role of adjuvant whole breast radiotherapy after breast conservation surgery per NCCN guidelines. In patients with negative axillary lymph nodes, WBRT +/- boost is standard of care.  In general, the benefits for whole breast radiation were reviewed and explained that adjuvant whole breast radiation following wide local excision decreases the risk of local recurrence by two-thirds to three-fourths and improves overall survival (Viveros et al., NSABP B-06, NEJM, 2002; EBCTCG Metanalysis, Lancet 2011).      In general, we reviewed the NCCN Guidelines for invasive breast cancer following  total mastectomy with surgical axillary lymph node staging. If four or more axillary nodes are involved, radiation therapy to the chest wall plus infraclavicular region, supraclavicular area, internal mammary nodes, and any part of the axillary bed at risk is a category 1 recommendation (based on high-level evidence and uniform consensus of the NCCN). In the setting of one to three axillary nodes are involved, the recommendation is to strongly consider radiation to the chest wall plus the regional manju sites as above. In patients with negative axillary lymph nodes and tumor >5cm, the recommendation is to consider radiation to the chest wall and regional lymph nodes. These recommendations are supported by various literature (Rogelio et al, EBCTCG Beggs-analysis, Lancet 2005, update Lancet 2014; Navgaagilda et al., 82b and 82c Combined Analysis, Radiother Oncol 2007).    In general, we discussed that despite histology, whether IDC or ILC, indications for PMRT are the same.  However, in general ILC generally have a poorer response to NACT. Thus, we discussed data/literature in the setting of NACT as well as T3N0 literature when NACT was not utilized.  In the setting of NACT, based upon analysis of patients enrolled in NSABP B-18 and B-27 (Janet et al, JCO, 2012)., those without complete pathologic response with tumors greater than 5 cm had approximately a 12-15% locoregional recurrence risk without radiotherapy at 10 years, with a majority of that risk at the chest wall. Further, we discussed historical T3N0 literature, we discussed there exists no randomized data for the role of adjuvant radiotherapy in cT3N0 patients that had mastectomy. In general, the role of adjuvant radiotherapy in these patients in relatively controversial, given low likelihood of recurrence in that setting after mastectomy alone. Retrospective studies that looked specifically at T3N0 tumors after upfront surgery showed low rates of recurrence  "without radiation, approximately 7% in all patients, and even lower for patients that received systemic therapy and without lymphovascular space invasion (Margie et al, NASBP Pooled Analysis, JCO, 2006; Darrick et al., Multi-Institutional Analysis, IJROBP, 2006).        Further, in this particular case, in addition to outcomes discussed above, we have to also factor patient's young age, desire for delayed reconstruction, as well as toxicity to normal organs at risk including heart and lung.  We discussed options of observation with endocrine therapy versus CW/\"high tangents\" to cover part of level I/II to be followed by endocrine thearpy, or comprehensive manju irradiation to be followed by endocrine therapy.        Additional time was spent specifically discussing the additional risk of cardiovascular disease due to radiation therapy. We reviewed retrospective data attributing a relative risk increase of 7.4% per Gy of mean heart dose (Meeta et al., NEJ, 2013). We discussed the meaning of relative risk in the setting of any other risk factors for cardiovascular disease, irrespective of the cancer diagnosis and/or treatment. Finally, we reviewed treatment techniques utilized in modern radiation therapy to minimize radiation dose to the heart.  Further, we discussed the logistics of treatment planning and delivery in detail with the patient. We also discussed side effects, including short term risks of fatigue and skin reaction, and long term risks of pneumonitis, lung fibrosis, soft tissue fibrosis, skin changes, breast contour changes, rib fractures, cardiac damage, secondary cancers, lymphedema, implant/reconstruction failure, and thyroid dysfunction. We described the use of techniques to minimize dose to the normal tissues, while adequately covering the target tissues, by using deep inspiratory breathhold (DIBH) and the ability of this technique to decrease potential for toxicity.  Plan     1. After initial " extensive discussion, patient was leaning towards pursuing radiation in the adjuvant setting, with preference to CW/high tangents approach to add coverage to axillary levels I/II (Given patient's wish for delayed reconstruction, with plan for for 50Gy/25fx with deep inspiratory breath hold).  In the interval, she has obtained a second opinion at [facility] with recommendation for CW and regional manju irradiation, and patient is most interested in pursuing this option. Thus, if patient is treated here we will treat chest wall and regional nodes (axillary, SCV, and IMN) to 50Gy/25fx, without boost with DIBH.    2.However, at this time, she is most interested in pursuing protons and possibly hypofractionation at [facility], and wishes to pursue a CT simulation there this week. If she is unable to pursue protons at [facility], she states she wishes to pursue RT at [place] and will contact our clinic to schedule a CT simulation (we will also plan for a bHCG prior to CT simulation). I extensively discussed the optimal timing of adjuvant radiation is approximately 6 weeks post surgery, and patient is aware of this timing.     3. Dr. Hadley has discussed the role of endocrine therapy.    All benefits and risks discussed, and patient is in agreement with the oncologic plan discussed above.     Thank you for allowing me to participate in your patient's care.  If you should require any additional information, please do not hesitate in contacting me.         Guillermo Ruiz MD  Department of Radiation Oncology  [facility]       Again, thank you for allowing me to participate in the care of your patient.        Sincerely,        Guillermo Ruiz MD

## 2022-05-24 NOTE — PATIENT INSTRUCTIONS
What to expect at your Simulation visit:    You will meet with a Radiation Therapist and other team members who will be doing a planning session called a  simulation  with you. This process will determine your daily treatment.    ~ You will lie on a flat table and have a treatment planning CT scan.  It is important during the scan to hold very still and breathe normally.    ~ Your therapist may construct a body mold to help you hold still for your treatments.    ~ If you are having treatment to the head or neck area you will be fitted with a plastic mesh mask that fits very snugly over your face and neck.     ~ Your therapist will be taking some digital photos that will go in your treatment chart.      ~Your therapist will make marks on your skin and take measurements. Your therapist may ask you about making small tattoos (a permanent small dot) over these marks.  These marks are used to position you daily for your radiation therapy treatments. Please do not wash off any marks until all of your radiation therapy treatments are complete unless you are instructed to do so by your therapist.    ~ Once the simulation is completed it can take from 3 to 10 business days before you start radiation therapy treatments.    ~ You may meet with a nurse who will go over management of treatment side effects and self care during your treatments. The nurse will help to plan care with other departments and physicians if needed.     Please contact [facility] RN with questions or concerns following today's appointment: [phone].    Thank you!

## 2022-05-24 NOTE — PROGRESS NOTES
Department of Radiation Oncology  [facility]: Cancer Center  [facility]  [address] [place] [phone]       Follow up Note    Name: Marylou Vera MRN: [id number]   : 1975   Date of Service: 2022 Referring: Dr. Hadley     Reason for consultation: left breast cancer    History of Present Illness     Ms. Vera is a 46 year old female presenting with a LEFT ER+ (40%)/WY+ (1%)/Her2- invasive lobular carcinoma, multifocal cT3N0, grade 1 s/p NACT (completed AC x4, taxol with partial response). She underwent bilateral mastectomy and left sentinel lymph node biopsy (Dr. Smith, 22) with pathology revealing residual 7.0 cm of invasive lobular carcinoma, grade 2, negative margins, no LVSI, 0/1 LN, ypT3N0. Right sided mastectomy pathology was negative for malignancy.    Briefly, her oncologic history is as follows:    Patient is a 46-year-old female who underwent a mammogram on 10/4/2021.  At that point mammogram demonstrated a large spiculated mass in the left breast at the 1 o'clock position approximately 5.5 cm in size, with the right breast being negative for any abnormality.    Ultrasound was performed on 10/4/2021 demonstrating a large spiculated mass in the left breast, without any evidence of lymphadenopathy.    She underwent biopsy on 10/5/2021 with pathology returning as an invasive lobular carcinoma, grade 1, ER positive (40%)/WY positive (1%)/HER-2 negative.    Breast MRI was obtained on 10/11/2021 demonstrating abnormal enhancement pattern measuring up to 8.2 cm in size extending from the 11 o'clock position to the 5 o'clock position with depth anterior to posterior, with evidence of a questionable satellite mass at the 12 o'clock position as well.  There is no evidence of any lymphadenopathy.  Based on this staging, she would be a clinically multifocal T3N0 left breast cancer (clinical prognostic Stage  IIA).    She underwent genetic testing which was negative.    She was seen by Dr. Hadley (medical oncology) with plans for initiating neoadjuvant chemotherapy with AC x4 followed by Taxol x12.  After completion of AC she underwent a repeat imaging with ultrasound and mammogram.  Ultrasound on 12/20/2021 demonstrated scattered areas of minimal distortion but nothing measurable, while mammogram identified architectural distortion in the left upper outer quadrant thus slightly dense than prior mammogram with persistent abnormality.      She completed NACT on March 14, 2022. MRI 3/7/22 demonstrated no residual suspicious enhancement in the left breast, most consistent with radiologic response to therapy. There was no lymphadenopathy.    She met with plastic surgery (Dr. Burroughs) with plans for delayed reconstruction.    She subsequently underwent bilateral mastectomy with left sentinel lymph node biopsy on 4/13/2022.  Pathology returned  residual 7.0 cm of invasive lobular carcinoma, grade 2, negative margins, no LVSI, 0/1 LN, ypT3N0. Right sided mastectomy pathology was negative for malignancy.    Interval history:    She was last seen in April 28, 2022 with plan for CT simulation and radiation planning to begin.  In the interval she has obtained a second opinion at [facility], who was recommended chest wall radiation as well as regional nodes with possible use of protons, which she is interested in. Otherwise, patient is doing well after surgery and denies any chest pain, tenderness, range of motion difficulties.     Past Medical History:   Past Medical History:   Diagnosis Date     Malignant neoplasm of left breast in female, estrogen receptor positive (H) 10/05/2021     Moderate major depression (H)      Personal history of chemotherapy     Adriamycin/Cytoxan + Taxol (Dr. Hadley - [facility]) 11/1/2022 - 3/14/2022       Past Surgical History:   Past Surgical History:   Procedure Laterality Date      BILATERAL MASTECTOMY WITH LEFT AXILLARY SENTINEL LYMPH NODE BIOPSY AND PORT REMOVAL          2022    Dr. Ritter - [facility]     BREAST BIOPSY, CORE RT/LT Left 10/05/2021      SECTION        SECTION       EXTRACTION OF WISDOM TEETH         Chemotherapy History:  Per HPI    Radiation History:  No prior radiation    Pregnant: No (will need bHCG prior to radiation)  Implanted Cardiac Devices: No    Medications:  Current Outpatient Medications   Medication     acetaminophen (TYLENOL) 500 MG tablet     Fexofenadine HCl (ALLEGRA PO)     Fluticasone Propionate (FLONASE NA)     IBUPROFEN PO     sertraline (ZOLOFT) 100 MG tablet     spironolactone (ALDACTONE) 25 MG tablet     No current facility-administered medications for this visit.       Allergies:     Allergies   Allergen Reactions     Doxycycline Nausea and Other (See Comments)     joint pain     Propofol      Had flash pulmonary edema following D&C, consensus is this was cause.     Augmentin Nausea       Social History:  Tobacco: Non-smoker  Alcohol: Occasional  Employment:  at [employer]    Family History:  Family History   Problem Relation Age of Onset     Lipids Mother      Prostate Cancer Father      Lung Cancer Maternal Grandfather      Testicular cancer Paternal Grandfather        Review of Systems   A 10-point review of systems was performed. Pertinent findings are noted in the HPI.    Physical Exam   ECOG Status: 0    Vitals:  /81 (BP Location: Right arm, Patient Position: Chair, Cuff Size: Adult Regular)   Pulse 76   Temp 98  F (36.7  C) (Oral)   Resp 18   Wt 66.7 kg (147 lb)   SpO2 99%     Gen: Alert, in NAD  Head: NC/AT  Eyes: PERRL, EOMI, sclera anicteric  Ears: No external auricular lesions  Nose/sinus: No rhinorrhea or epistaxis  Breast: b/l chest wall healing well, incisions CDI, no drainage, mild edema/seroma formation, no upper extremity range of motion limitation     Oral cavity/oropharynx: MMM, no  visible oral cavity lesions  Neck: Full ROM, supple, no palpable adenopathy  Pulm: No wheezing, stridor or respiratory distress  CV: Extremities are warm and well-perfused, no cyanosis, no pedal edema  Abdominal: Normal bowel sounds, soft, nontender, no masses  Musculoskeletal: Normal bulk and tone  Skin: Normal color and turgor  Neuro: A/Ox3, CN II-XII intact, normal gait    Imaging/Path/Labs   Imaging: per HPI, personally reviewed and in agreement     Path: per HPI, personally reviewed and in agreement     Labs: per HPI, personally reviewed and in agreement     Assessment      Ms. Vera is a 46 year old female presenting with a LEFT ER+ (40%)/AZ+ (1%)/Her2- invasive lobular carcinoma, multifocal cT3N0, grade 1 s/p NACT (completed AC x4, taxol with partial response). She underwent bilateral mastectomy and left sentinel lymph node biopsy (Dr. Smith, 4/13/22) with pathology revealing residual 7.0 cm of invasive lobular carcinoma, grade 2, negative margins, no LVSI, 0/1 LN, ypT3N0. Right sided mastectomy pathology was negative for malignancy.    In general, we discussed the role of adjuvant whole breast radiotherapy after breast conservation surgery per NCCN guidelines. In patients with negative axillary lymph nodes, WBRT +/- boost is standard of care.  In general, the benefits for whole breast radiation were reviewed and explained that adjuvant whole breast radiation following wide local excision decreases the risk of local recurrence by two-thirds to three-fourths and improves overall survival (Viveros et al., NSABP B-06, NEJ, 2002; EBCTCG Metanalysis, Lancet 2011).      In general, we reviewed the NCCN Guidelines for invasive breast cancer following total mastectomy with surgical axillary lymph node staging. If four or more axillary nodes are involved, radiation therapy to the chest wall plus infraclavicular region, supraclavicular area, internal mammary nodes, and any part of the axillary bed at risk is a  category 1 recommendation (based on high-level evidence and uniform consensus of the NCCN). In the setting of one to three axillary nodes are involved, the recommendation is to strongly consider radiation to the chest wall plus the regional manju sites as above. In patients with negative axillary lymph nodes and tumor >5cm, the recommendation is to consider radiation to the chest wall and regional lymph nodes. These recommendations are supported by various literature (Rogelio et al, EBCTCG Cheraw-analysis, Lancet 2005, update Lancet 2014; Overgaard et al., 82b and 82c Combined Analysis, Radiother Oncol 2007).    In general, we discussed that despite histology, whether IDC or ILC, indications for PMRT are the same.  However, in general ILC generally have a poorer response to NACT. Thus, we discussed data/literature in the setting of NACT as well as T3N0 literature when NACT was not utilized.  In the setting of NACT, based upon analysis of patients enrolled in NSABP B-18 and B-27 (Janet et al, JCO, 2012)., those without complete pathologic response with tumors greater than 5 cm had approximately a 12-15% locoregional recurrence risk without radiotherapy at 10 years, with a majority of that risk at the chest wall. Further, we discussed historical T3N0 literature, we discussed there exists no randomized data for the role of adjuvant radiotherapy in cT3N0 patients that had mastectomy. In general, the role of adjuvant radiotherapy in these patients in relatively controversial, given low likelihood of recurrence in that setting after mastectomy alone. Retrospective studies that looked specifically at T3N0 tumors after upfront surgery showed low rates of recurrence without radiation, approximately 7% in all patients, and even lower for patients that received systemic therapy and without lymphovascular space invasion (Margie turner al, NASBP Pooled Analysis, JCO, 2006; Darrick et al., Multi-Institutional Analysis, IJROBP, 2006).    "     Further, in this particular case, in addition to outcomes discussed above, we have to also factor patient's young age, desire for delayed reconstruction, as well as toxicity to normal organs at risk including heart and lung.  We discussed options of observation with endocrine therapy versus CW/\"high tangents\" to cover part of level I/II to be followed by endocrine thearpy, or comprehensive manju irradiation to be followed by endocrine therapy.        Additional time was spent specifically discussing the additional risk of cardiovascular disease due to radiation therapy. We reviewed retrospective data attributing a relative risk increase of 7.4% per Gy of mean heart dose (Meeta et al., NEJ, 2013). We discussed the meaning of relative risk in the setting of any other risk factors for cardiovascular disease, irrespective of the cancer diagnosis and/or treatment. Finally, we reviewed treatment techniques utilized in modern radiation therapy to minimize radiation dose to the heart.  Further, we discussed the logistics of treatment planning and delivery in detail with the patient. We also discussed side effects, including short term risks of fatigue and skin reaction, and long term risks of pneumonitis, lung fibrosis, soft tissue fibrosis, skin changes, breast contour changes, rib fractures, cardiac damage, secondary cancers, lymphedema, implant/reconstruction failure, and thyroid dysfunction. We described the use of techniques to minimize dose to the normal tissues, while adequately covering the target tissues, by using deep inspiratory breathhold (DIBH) and the ability of this technique to decrease potential for toxicity.  Plan     1. After initial extensive discussion, patient was leaning towards pursuing radiation in the adjuvant setting, with preference to CW/high tangents approach to add coverage to axillary levels I/II (Given patient's wish for delayed reconstruction, with plan for for 50Gy/25fx with deep " inspiratory breath hold).  In the interval, she has obtained a second opinion at [facility] with recommendation for CW and regional manju irradiation, and patient is most interested in pursuing this option. Thus, if patient is treated here we will treat chest wall and regional nodes (axillary, SCV, and IMN) to 50Gy/25fx, without boost with DIBH.    2.However, at this time, she is most interested in pursuing protons and possibly hypofractionation at [facility], and wishes to pursue a CT simulation there this week. If she is unable to pursue protons at [facility], she states she wishes to pursue RT at [facility] and will contact our clinic to schedule a CT simulation (we will also plan for a CG prior to CT simulation). I extensively discussed the optimal timing of adjuvant radiation is approximately 6 weeks post surgery, and patient is aware of this timing.     3. Dr. Hadley has discussed the role of endocrine therapy.    All benefits and risks discussed, and patient is in agreement with the oncologic plan discussed above.     Thank you for allowing me to participate in your patient's care.  If you should require any additional information, please do not hesitate in contacting me.         Guillermo Ruiz MD  Department of Radiation Oncology  [facility]

## 2022-09-03 ENCOUNTER — HEALTH MAINTENANCE LETTER (OUTPATIENT)
Age: 47
End: 2022-09-03

## 2023-01-15 ENCOUNTER — HEALTH MAINTENANCE LETTER (OUTPATIENT)
Age: 48
End: 2023-01-15

## 2024-02-17 ENCOUNTER — HEALTH MAINTENANCE LETTER (OUTPATIENT)
Age: 49
End: 2024-02-17

## 2025-03-08 ENCOUNTER — HEALTH MAINTENANCE LETTER (OUTPATIENT)
Age: 50
End: 2025-03-08